# Patient Record
Sex: FEMALE | Race: BLACK OR AFRICAN AMERICAN | NOT HISPANIC OR LATINO | URBAN - METROPOLITAN AREA
[De-identification: names, ages, dates, MRNs, and addresses within clinical notes are randomized per-mention and may not be internally consistent; named-entity substitution may affect disease eponyms.]

---

## 2022-06-26 ENCOUNTER — EMERGENCY (EMERGENCY)
Facility: HOSPITAL | Age: 37
LOS: 1 days | Discharge: HOME | End: 2022-06-26
Admitting: EMERGENCY MEDICINE

## 2022-06-26 VITALS
SYSTOLIC BLOOD PRESSURE: 105 MMHG | OXYGEN SATURATION: 98 % | RESPIRATION RATE: 18 BRPM | TEMPERATURE: 98 F | HEART RATE: 102 BPM | DIASTOLIC BLOOD PRESSURE: 75 MMHG

## 2022-06-26 VITALS — HEART RATE: 96 BPM

## 2022-06-26 LAB — HCG UR QL: NEGATIVE — SIGNIFICANT CHANGE UP

## 2022-06-26 PROCEDURE — 93010 ELECTROCARDIOGRAM REPORT: CPT

## 2022-06-26 PROCEDURE — 99284 EMERGENCY DEPT VISIT MOD MDM: CPT

## 2022-06-26 RX ORDER — ACETAMINOPHEN 500 MG
650 TABLET ORAL ONCE
Refills: 0 | Status: COMPLETED | OUTPATIENT
Start: 2022-06-26 | End: 2022-06-26

## 2022-06-26 RX ADMIN — Medication 650 MILLIGRAM(S): at 17:42

## 2022-06-26 NOTE — ED PROVIDER NOTE - MUSCULOSKELETAL, MLM
Spine appears normal, range of motion is intact, mild midline T10-12 ttp, motor and sensation intact; normal gait

## 2022-06-26 NOTE — ED PROVIDER NOTE - PATIENT PORTAL LINK FT
You can access the FollowMyHealth Patient Portal offered by [facility] by registering at the following website: [email or web address]. By joining [facility]’s FollowMyHealth portal, you will also be able to view your health information using other applications (apps) compatible with our system.

## 2022-06-26 NOTE — ED PROVIDER NOTE - NSFOLLOWUPINSTRUCTIONS_ED_ALL_ED_FT
Heat Exhaustion      Heat exhaustion happens when the body gets overheated from hot weather, exercise, strenuous physical activity, dehydration, or a combination of these. It is important to treat heat exhaustion as soon as possible. If untreated, heat exhaustion can lead to heat stroke, which is a medical emergency and can be life-threatening.      What are the causes?    Common causes of heat exhaustion include:  •Exercising or doing strenuous labor or activity in hot or humid weather.      •Being exposed to very warm and poorly ventilated environments, such as living in a home without air conditioning or being in a car without good ventilation.      •Not drinking enough water, especially in hot or humid weather.      •Not having enough salts and minerals in the blood (electrolytes).        What increases the risk?    The following factors may make you more likely to develop this condition:  •Exercising beyond your fitness level in hot conditions.      •Exercising or working in hot weather when your body is not used to the heat.      •Wearing clothing that does not allow your sweat to evaporate.      •Drinking a lot of alcoholic beverages or beverages that have caffeine. This can lead to dehydration.      •Being age 65 or older.      •Being a child.      •Having certain chronic medical conditions, such as heart disease, poor circulation or other vascular diseases, sickle cell disease, high blood pressure, diabetes, lung disease, or cystic fibrosis.      •Being very overweight (obese).      •Taking certain medicines, such as those for high blood pressure, antihistamines, or tranquilizers.      •Using drugs such as cocaine, heroin, or amphetamines.        What are the signs or symptoms?    Symptoms of this condition include:  •Heavy sweating along with feeling weak, dizzy, light-headed, and nauseous.      •Vomiting.      •Red, blotchy rash over the body. This is also called prickly heat.      •Rapid heartbeat.      •Headache.      •Body temperature over 102.2ºF (39ºC).      •Urine that is darker than normal.      •Muscle cramps, such as in the leg or side (flank).      •Moist, cool, and clammy skin.      •Fatigue.      •Thirst.      •Difficulty focusing or concentrating.      •Passing out.      Signs and symptoms may develop suddenly or over time.      How is this diagnosed?    This condition may be diagnosed based on your symptoms, a physical exam, and history of heat exposure.      How is this treated?    This condition may be treated by:  •Immediately stopping physical activity.      •Moving to a cooler, shaded area with good ventilation and airflow, or to an air-conditioned environment.      •Removing all unnecessary clothing to expose as much skin to the air as possible.      •Using cooling fans and water-misting sprays to cool the body down.      •Drinking plenty of fluids, including sports drinks with electrolytes.      •Having cooling blankets placed on you to lower your body temperature.      •Giving you fluids through an IV in a hospital.        Follow these instructions at home:     If you think that you have heat exhaustion:  •Ask a friend or a family member to stay with you.      •Take a cool bath or shower.      •Drink enough fluid to keep your urine pale yellow.      •Lie down and rest.      •Return to your normal activities as told by your health care provider. Ask your health care provider what activities are safe for you.        Contact a health care provider if:    •Symptoms last for more than 30 minutes.      •You feel your symptoms are not improving after taking steps to cool down.        Get help right away if:  •You have any symptoms of heat stroke. These include:  •Temperature of 104°F (40°C) or higher.      •Diffusely red skin.      •Inability to sweat, resulting in hot, dry skin.      •Excessive thirst.      •Nausea and vomiting.      •Rapid breathing.      •Chest pain.      •Headache.      •Confusion or disorientation.      •Fainting.      •Seizure.        These symptoms may represent a serious problem that is an emergency. Do not wait to see if the symptoms will go away. Get medical help right away. Call your local emergency services (911 in the [place] Do not drive yourself to the hospital.       Summary    •Heat exhaustion happens when your body gets overheated from hot weather, strenuous activity, and dehydration.      •Symptoms include sweating, fatigue, muscle cramps, and headache.      •Treat heat exhaustion immediately by moving to a ventilated and cool environment, having cool water sprayed on as much of the skin as possible, removing all unnecessary clothing, and drinking fluids with electrolytes.      •If your symptoms last for more than 30 minutes, contact a health care provider.      •If untreated, heat exhaustion can lead to heat stroke, which is a medical emergency and can be life-threatening.      This information is not intended to replace advice given to you by your health care provider. Make sure you discuss any questions you have with your health care provider.

## 2022-06-26 NOTE — ED PROVIDER NOTE - CLINICAL SUMMARY MEDICAL DECISION MAKING FREE TEXT BOX
37 yo F, pmhx DM and hypothyroidism, presenting to the ED w/ episode of syncope today. Patient reports feeling unwell due to the heat then passing out. She regained consciousness and after being given water by EMS, feels like she is back to her USOH. Pt denies any complaints at this time.  in triage, /75. Pt has no red flags noted on exam is a+ox3 and is well appearing. She is given food and drink here. Likely vasovagal episode d/t the heat and poor food/water intake. Will check uhcg and EKG. Likely plan for DC. 37 yo F, pmhx DM and hypothyroidism, presenting to the ED w/ episode of syncope today. Patient reports feeling unwell due to the heat then passing out. She regained consciousness and after being given water by EMS, feels like she is back to her USOH. Pt reports mild mid back pain - offered XR but refused by patient. She has no gross neuro deficits on exam and can range/walk normally. Pt instructed to return to ED for XR if she later changes her mind; will give tylenol for pain relief.  in triage, /75. Pt has no red flags noted on exam is a+ox3 and is well appearing. She is given food and drink here. Likely vasovagal episode d/t the heat and poor food/water intake. Will check uhcg and EKG. Likely plan for DC.

## 2022-06-26 NOTE — ED PROVIDER NOTE - OBJECTIVE STATEMENT
35 yo F, pmhx DM and hypothyroidism, presenting to the ED w/ episode of syncope today. Pt was celebrating at the pride event and states she has not eaten all day and also has not drank water since taking her levothyroxine this AM. Pt states she began to feel unwell due to the heat, flagged down a  to tell them she might pass out, then passed out while falling on the . Pt says she hit the ground but did not hit her head, she landed on her butt. Now having arrived to the ED, patient is back at her baseline and says she feels back to normal. Pt checked her FSg this AM and found it to be 162. She takes Victoza in the AM and Basiglaur in the evening.  She denies chest pain, SOB, lightheadedess, current dizziness, fevers, cough, abd pain, or urinary symptoms. 35 yo F, pmhx DM and hypothyroidism, presenting to the ED w/ episode of syncope today. Pt was celebrating at the pride event and states she has not eaten all day and also has not drank water since taking her levothyroxine this AM. Pt states she began to feel unwell due to the heat, flagged down a  to tell them she might pass out, then passed out while falling on the . Pt says she hit the ground but did not hit her head, she landed on her butt. Now having arrived to the ED, patient is back at her baseline. She does endorse mild mid back pain from after her fall but says it is not very severe. Pt checked her FSg this AM and found it to be 162. She takes Victoza in the AM and Basiglaur in the evening.  She denies chest pain, SOB, lightheadedess, current dizziness, fevers, cough, abd pain, or urinary symptoms.

## 2022-06-28 DIAGNOSIS — Z91.013 ALLERGY TO SEAFOOD: ICD-10-CM

## 2022-06-28 DIAGNOSIS — R55 SYNCOPE AND COLLAPSE: ICD-10-CM

## 2022-06-28 DIAGNOSIS — Z91.041 RADIOGRAPHIC DYE ALLERGY STATUS: ICD-10-CM

## 2022-06-28 DIAGNOSIS — E03.9 HYPOTHYROIDISM, UNSPECIFIED: ICD-10-CM

## 2022-06-28 DIAGNOSIS — M54.9 DORSALGIA, UNSPECIFIED: ICD-10-CM

## 2022-06-28 DIAGNOSIS — Z88.0 ALLERGY STATUS TO PENICILLIN: ICD-10-CM

## 2022-06-28 DIAGNOSIS — E11.9 TYPE 2 DIABETES MELLITUS WITHOUT COMPLICATIONS: ICD-10-CM
